# Patient Record
Sex: FEMALE | Race: WHITE
[De-identification: names, ages, dates, MRNs, and addresses within clinical notes are randomized per-mention and may not be internally consistent; named-entity substitution may affect disease eponyms.]

---

## 2020-12-04 ENCOUNTER — HOSPITAL ENCOUNTER (EMERGENCY)
Dept: HOSPITAL 26 - MED | Age: 40
Discharge: LEFT BEFORE DISCHARGE | End: 2020-12-04
Payer: MEDICAID

## 2020-12-04 VITALS — DIASTOLIC BLOOD PRESSURE: 125 MMHG | SYSTOLIC BLOOD PRESSURE: 162 MMHG

## 2020-12-04 VITALS — BODY MASS INDEX: 27.31 KG/M2 | HEIGHT: 64 IN | WEIGHT: 160 LBS

## 2020-12-04 DIAGNOSIS — M79.602: Primary | ICD-10-CM

## 2020-12-04 DIAGNOSIS — E11.9: ICD-10-CM

## 2020-12-04 DIAGNOSIS — I10: ICD-10-CM

## 2020-12-04 NOTE — NUR
40/F BIB FAMILY C/O COUGH, CHILLS, BODY ACHE X 2 WEEKS. COVID TESTED 3 DAYS AGO 
: PENDING RESULT.



C/O LEFT ARM, LEFT ELBOW PAIN AFTER [employer] PULL OUT OF TRUCK 2 HOURS AGO.





[facility]: KASH

-------------------------------------------------------------------------------

Addendum: 12/04/20 at 1844 by [email or web address]

-------------------------------------------------------------------------------

LEFT SHOULDER PAIN